# Patient Record
Sex: MALE | Race: WHITE
[De-identification: names, ages, dates, MRNs, and addresses within clinical notes are randomized per-mention and may not be internally consistent; named-entity substitution may affect disease eponyms.]

---

## 2018-02-25 ENCOUNTER — HOSPITAL ENCOUNTER (EMERGENCY)
Dept: HOSPITAL 52 - LL.ED | Age: 73
Discharge: NURSING, REHAB OR LONG-TERM CARE | End: 2018-02-25
Payer: MEDICARE

## 2018-02-25 DIAGNOSIS — I50.9: ICD-10-CM

## 2018-02-25 DIAGNOSIS — E78.00: ICD-10-CM

## 2018-02-25 DIAGNOSIS — I44.7: ICD-10-CM

## 2018-02-25 DIAGNOSIS — R79.89: ICD-10-CM

## 2018-02-25 DIAGNOSIS — I11.0: Primary | ICD-10-CM

## 2018-02-25 LAB
CHLORIDE SERPL-SCNC: 104 MMOL/L (ref 98–107)
SODIUM SERPL-SCNC: 143 MMOL/L (ref 136–145)

## 2018-02-25 PROCEDURE — 93005 ELECTROCARDIOGRAM TRACING: CPT

## 2018-02-25 PROCEDURE — 83880 ASSAY OF NATRIURETIC PEPTIDE: CPT

## 2018-02-25 PROCEDURE — 85025 COMPLETE CBC W/AUTO DIFF WBC: CPT

## 2018-02-25 PROCEDURE — 71275 CT ANGIOGRAPHY CHEST: CPT

## 2018-02-25 PROCEDURE — 96374 THER/PROPH/DIAG INJ IV PUSH: CPT

## 2018-02-25 PROCEDURE — 82553 CREATINE MB FRACTION: CPT

## 2018-02-25 PROCEDURE — 84484 ASSAY OF TROPONIN QUANT: CPT

## 2018-02-25 PROCEDURE — 71046 X-RAY EXAM CHEST 2 VIEWS: CPT

## 2018-02-25 PROCEDURE — 85379 FIBRIN DEGRADATION QUANT: CPT

## 2018-02-25 PROCEDURE — 80053 COMPREHEN METABOLIC PANEL: CPT

## 2018-02-25 PROCEDURE — 81001 URINALYSIS AUTO W/SCOPE: CPT

## 2018-02-25 PROCEDURE — 99285 EMERGENCY DEPT VISIT HI MDM: CPT

## 2018-02-25 PROCEDURE — 36415 COLL VENOUS BLD VENIPUNCTURE: CPT

## 2018-02-25 NOTE — EDM.PDOC
ED HPI GENERAL MEDICAL PROBLEM





- General


Chief Complaint: Respiratory Problem


Stated Complaint: sob


Time Seen by Provider: 02/25/18 05:18


Source of Information: Reports: Patient


History Limitations: Reports: No Limitations





- History of Present Illness


INITIAL COMMENTS - FREE TEXT/NARRATIVE: 





Patient comes to ER with complaint of SOB that has been present for several 

days.  Better when upright and has been sleeping in his recliner for the past 

few nights.  Worsens with activity/laying flat. 





Denies cough. No chest pain. Recalls having several episodes of lower chest 

pain around rib margins on right approximately a month ago that were brief and 

he attributed these to pleurisy/URI. No medical attention sought at that time. 





Denies heart/cardiovascular history except for hypertension. Non-smoker. 





Feels a pressure sensation in his shoulders at times, not pain. Also numbness/

tingling feeling in lower arms/hands.  He said this went away when placed on 

oxygen in the ER. 





Denies HEENT/GI/ changes. No fevers/chills. No recent illnesses or injuries. 





Says that overall he has an unremarkable past medical history. No CAD in family 

history. 


  ** Left Shoulder


Pain Score (Numeric/FACES): 3





- Related Data


 Allergies











Allergy/AdvReac Type Severity Reaction Status Date / Time


 


No Known Allergies Allergy   Verified 02/25/18 04:37











Home Meds: 


 Home Meds





DULoxetine HCl [Duloxetine HCl] 30 mg PO BID 02/25/18 [History]


Losartan/Hydrochlorothiazide [Losartan-HCTZ 100-25 MG] 1 tab PO DAILY 02/25/18 [

History]


Pantoprazole Sodium 40 mg PO DAILY 02/25/18 [History]


Potassium Chloride 20 meq PO DAILY 02/25/18 [History]











Past Medical History


HEENT History: Reports: Allergic Rhinitis


Cardiovascular History: Reports: High Cholesterol, Hypertension, Other (See 

Below) (previous Dx of possible incomplete RBBB)


Respiratory History: Reports: COPD (by CXR changes)


Gastrointestinal History: Reports: GERD, Hiatal Hernia, Irritable Bowel Syndrome

, PUD


Genitourinary History: Reports: BPH, Other (See Below) (Erectile dysfunction)


Musculoskeletal History: Reports: Osteoarthritis





Social & Family History





- Family History


Family Medical History: Noncontributory (Patient denies history of cardiac 

disease in family.  He does say that cancer runs in the family.)





- Tobacco Use


Smoking Status *Q: Never Smoker





- Alcohol Use


Alcohol Use History: No





- Recreational Drug Use


Recreational Drug Use: No


Drug Use in Last 12 Months: No





ED ROS GENERAL





- Review of Systems


Review Of Systems: See Below


Constitutional: Reports: Fatigue.  Denies: Fever, Chills, Diaphoresis, Weight 

Loss, Weight Gain


HEENT: Reports: No Symptoms


Respiratory: Reports: Shortness of Breath, Wheezing.  Denies: Pleuritic Chest 

Pain, Cough, Sputum, Hemoptysis


Cardiovascular: Reports: Dyspnea on Exertion.  Denies: Chest Pain, Edema, 

Lightheadedness, Palpitations, Syncope


GI/Abdominal: Reports: No Symptoms


: Reports: No Symptoms


Musculoskeletal: Reports: Other (tightness sensation left shoulder as well as 

both hands/wrists. Says that it is not actually painful.  )


Skin: Reports: No Symptoms


Neurological: Reports: Paresthesia (bilateral wrists/hands).  Denies: Confusion

, Dizziness, Headache, Trouble Speaking, Change in Speech, Gait Disturbance


Psychiatric: Reports: No Symptoms





ED EXAM, GENERAL





- Physical Exam


Exam: See Below


Exam Limited By: No Limitations


General Appearance: Alert, WD/WN, No Apparent Distress, Obese


Eye Exam: Bilateral Eye: EOMI, PERRL


Ears: Normal External Exam, Normal Canal


Nose: Normal Inspection


Throat/Mouth: Normal Inspection, Normal Lips, Normal Voice, No Airway Compromise


Head: Atraumatic, Normocephalic


Neck: Normal Inspection, Supple, Non-Tender, Full Range of Motion.  No: 

Lymphadenopathy (L), Lymphadenopathy (R)


Respiratory/Chest: No Respiratory Distress, No Accessory Muscle Use, Chest Non-

Tender, Decreased Breath Sounds (bilaterally lower lungs), Crackles (noted 

bilaterally, lower half lungs, faint).  No: Rales, Wheezing, Stridor, Accessory 

Muscle Use, Retractions


Cardiovascular: Normal Peripheral Pulses, Regular Rate, Rhythm, No Edema, No 

Murmur


Peripheral Pulses: 2+: Radial (L), Radial (R), Dorsalis Pedis (L), Dorsalis 

Pedis (R)


GI/Abdominal: Normal Bowel Sounds, Soft, Non-Tender, Other (obese, very rounded)

.  No: Guarding, Rigid, Rebound, Tender


 (Male) Exam: Deferred


Rectal (Males) Exam: Deferred


Back Exam: Normal Inspection


Extremities: Normal Range of Motion, Non-Tender, No Pedal Edema, Other (bluish 

tint to nail beds noted)


Neurological: Alert, Oriented, Normal Cognition, No Motor/Sensory Deficits


Psychiatric: Normal Affect, Normal Mood


Skin Exam: Warm, Dry, Intact





EKG INTERPRETATION


EKG Date: 02/25/18


Time: 05:11


Rhythm: Other (Sinus rhythm)


Rate (Beats/Min): 86


Axis: Normal


P-Wave: Present


QRS: LBBB


QT: Normal


Comparison: Change From Previous EKG


EKG Interpretation Comments: 





LBBB noted. No recent EKGs available to compare until 2009.  LBBB not present 

at that time. 


1st degree AV block present. 


Intermittent PACs noted on monitor and on EKG. 





Course





- Vital Signs


Last Recorded V/S: 


 Last Vital Signs











Temp  36.6 C   02/25/18 07:10


 


Pulse  90   02/25/18 07:25


 


Resp  22 H  02/25/18 07:25


 


BP  133/92 H  02/25/18 07:10


 


Pulse Ox  95   02/25/18 07:25














- Orders/Labs/Meds


Orders: 


 Active Orders 24 hr











 Category Date Time Status


 


 EKG Documentation Completion [RC] ASDIRECTED Care  02/25/18 04:49 Active


 


 RT Aerosol Therapy [RC] ASDIRECTED Care  02/25/18 05:20 Active


 


 CXR [Chest 2V] [CR] Stat Exams  02/25/18 04:48 Taken


 


 PE Chest [Ang Chest] [CT] Stat Exams  02/25/18 05:47 Taken


 


 Obtain Past Medical Record [OM.PC] Routine Oth  02/25/18 06:11 Active


 


 Saline Lock Insert [OM.PC] Routine Oth  02/25/18 04:51 Ordered











Labs: 


 Laboratory Tests











  02/25/18 02/25/18 02/25/18 Range/Units





  04:49 05:13 05:13 


 


WBC   8.7   (4.0-10.2)  K/uL


 


RBC   4.33   (4.33-5.41)  M/uL


 


Hgb   14.7   (13.1-16.8)  g/dL


 


Hct   43.6   (39.0-49.0)  %


 


MCV   100.7 H   (84.0-98.0)  fL


 


MCH   33.9 H   (28.2-33.3)  pg


 


MCHC   33.7   (31.7-36.0)  g/dL


 


RDW   13.5   (11.2-14.1)  %


 


Plt Count   258   (150-350)  K/uL


 


Neut % (Auto)   76.2   (45.0-80.0)  %


 


Lymph % (Auto)   14.0   (10.0-50.0)  %


 


Mono % (Auto)   8.2   (2.0-14.0)  %


 


Eos % (Auto)   1.4   (0.0-5.0)  %


 


Baso % (Auto)   0.2   (0.0-2.0)  %


 


Neut # (Auto)   6.63   (1.40-7.00)  K/uL


 


Lymph # (Auto)   1.22   (0.50-3.50)  K/uL


 


Mono # (Auto)   0.71   (0.00-1.00)  K/uL


 


Eos # (Auto)   0.12   (0.00-0.50)  K/uL


 


Baso # (Auto)   0.02   (0.00-0.20)  K/uL


 


D-Dimer, Quantitative    530 H  (0-400)  ng/mL


 


Sodium     (136-145)  mmol/L


 


Potassium     (3.5-5.1)  mmol/L


 


Chloride     ()  mmol/L


 


Carbon Dioxide     (21.0-32.0)  mmol/L


 


BUN     (7-18)  mg/dL


 


Creatinine     (0.51-1.17)  mg/dL


 


Est Cr Clr Drug Dosing     mL/min


 


Estimated GFR (MDRD)     mL/min


 


Glucose     ()  mg/dL


 


Calcium     (8.5-10.1)  mg/dL


 


Total Bilirubin     (0.2-1.0)  mg/dL


 


AST     (15-37)  U/L


 


ALT     (12-78)  U/L


 


Alkaline Phosphatase     ()  IU/L


 


Creatine Kinase Index     (0.0-2.5)  %


 


CK-MB (CK-2)     (0.00-3.60)  ng/mL


 


Troponin I     (0.000-0.056)  ng/mL


 


NT-Pro-B Natriuret Pep     (0-125)  pg/mL


 


Total Protein     (6.4-8.2)  g/dL


 


Albumin     (3.4-5.0)  g/dL


 


Specimen Type  Urincc    


 


Urine Color  Citlaly    


 


Urine Appearance  Clear    


 


Urine pH  5.5    (5.0-9.0)  


 


Ur Specific Gravity  1.010    (1.005-1.030)  


 


Urine Protein  30 H    (NEGATIVE)  mg/dL


 


Urine Glucose (UA)  Negative    (NEGATIVE)  mg/dL


 


Urine Ketones  Negative    (NEGATIVE)  mg/dL


 


Urine Occult Blood  Trace-intact H    (NEGATIVE)  


 


Urine Nitrite  Negative    (NEGATIVE)  


 


Urine Bilirubin  Negative    (NEGATIVE)  


 


Urine Urobilinogen  0.2    (0.2-1.0)  E.U./dL


 


Ur Leukocyte Esterase  Negative    (NEGATIVE)  


 


Urine RBC  0-5    /HPF


 


Urine WBC  0-5    /HPF


 


Ur Epithelial Cells  Occasional    /LPF


 


Urine Bacteria  Occasional    (NONE TO FEW)  /HPF














  02/25/18 Range/Units





  05:13 


 


WBC   (4.0-10.2)  K/uL


 


RBC   (4.33-5.41)  M/uL


 


Hgb   (13.1-16.8)  g/dL


 


Hct   (39.0-49.0)  %


 


MCV   (84.0-98.0)  fL


 


MCH   (28.2-33.3)  pg


 


MCHC   (31.7-36.0)  g/dL


 


RDW   (11.2-14.1)  %


 


Plt Count   (150-350)  K/uL


 


Neut % (Auto)   (45.0-80.0)  %


 


Lymph % (Auto)   (10.0-50.0)  %


 


Mono % (Auto)   (2.0-14.0)  %


 


Eos % (Auto)   (0.0-5.0)  %


 


Baso % (Auto)   (0.0-2.0)  %


 


Neut # (Auto)   (1.40-7.00)  K/uL


 


Lymph # (Auto)   (0.50-3.50)  K/uL


 


Mono # (Auto)   (0.00-1.00)  K/uL


 


Eos # (Auto)   (0.00-0.50)  K/uL


 


Baso # (Auto)   (0.00-0.20)  K/uL


 


D-Dimer, Quantitative   (0-400)  ng/mL


 


Sodium  143  (136-145)  mmol/L


 


Potassium  3.3 L  (3.5-5.1)  mmol/L


 


Chloride  104  ()  mmol/L


 


Carbon Dioxide  25.7  (21.0-32.0)  mmol/L


 


BUN  29 H  (7-18)  mg/dL


 


Creatinine  0.92  (0.51-1.17)  mg/dL


 


Est Cr Clr Drug Dosing  72.74  mL/min


 


Estimated GFR (MDRD)  > 60  mL/min


 


Glucose  141 H  ()  mg/dL


 


Calcium  9.4  (8.5-10.1)  mg/dL


 


Total Bilirubin  0.9  (0.2-1.0)  mg/dL


 


AST  17  (15-37)  U/L


 


ALT  39  (12-78)  U/L


 


Alkaline Phosphatase  62  ()  IU/L


 


Creatine Kinase Index  2.0  (0.0-2.5)  %


 


CK-MB (CK-2)  3.00  (0.00-3.60)  ng/mL


 


Troponin I  0.081 H*  (0.000-0.056)  ng/mL


 


NT-Pro-B Natriuret Pep  1433 H  (0-125)  pg/mL


 


Total Protein  7.5  (6.4-8.2)  g/dL


 


Albumin  3.4  (3.4-5.0)  g/dL


 


Specimen Type   


 


Urine Color   


 


Urine Appearance   


 


Urine pH   (5.0-9.0)  


 


Ur Specific Gravity   (1.005-1.030)  


 


Urine Protein   (NEGATIVE)  mg/dL


 


Urine Glucose (UA)   (NEGATIVE)  mg/dL


 


Urine Ketones   (NEGATIVE)  mg/dL


 


Urine Occult Blood   (NEGATIVE)  


 


Urine Nitrite   (NEGATIVE)  


 


Urine Bilirubin   (NEGATIVE)  


 


Urine Urobilinogen   (0.2-1.0)  E.U./dL


 


Ur Leukocyte Esterase   (NEGATIVE)  


 


Urine RBC   /HPF


 


Urine WBC   /HPF


 


Ur Epithelial Cells   /LPF


 


Urine Bacteria   (NONE TO FEW)  /HPF











Meds: 


Medications














Discontinued Medications














Generic Name Dose Route Start Last Admin





  Trade Name Freq  PRN Reason Stop Dose Admin


 


Albuterol/Ipratropium  3 ml  02/25/18 05:19  02/25/18 05:31





  Duoneb 3.0-0.5 Mg/3 Ml  NEB  02/25/18 05:20  3 ml





  ONETIME ONE   Administration


 


Aspirin  Confirm  02/25/18 07:33  02/25/18 08:30





  Aspirin  Administered  02/25/18 07:34  Not Given





  Dose   





  324 mg   





  .ROUTE   





  .STK-MED ONE   


 


Aspirin  324 mg  02/25/18 07:50  02/25/18 07:34





  Aspirin  PO  02/25/18 07:51  324 mg





  ONETIME ONE   Administration


 


Furosemide  40 mg  02/25/18 06:45  02/25/18 07:04





  Lasix  IVPUSH  02/25/18 06:46  Not Given





  NOW ONE   


 


Heparin Sodium (Porcine)  Confirm  02/25/18 07:33  02/25/18 08:29





  Heparin Sodium  Administered  02/25/18 07:34  Not Given





  Dose   





  5,000 units   





  .ROUTE   





  .STK-MED ONE   


 


Heparin Sodium (Porcine)  4,000 units  02/25/18 07:52  02/25/18 07:34





  Heparin Sodium  IVPUSH  02/25/18 07:53  4,000 units





  ONETIME ONE   Administration


 


Iopamidol  100 ml  02/25/18 05:51  02/25/18 06:42





  Isovue-370 (76%)  IVPUSH  02/25/18 05:52  100 ml





  ONETIME ONE   Administration


 


Nitroglycerin  0.4 mg  02/25/18 06:34  02/25/18 06:58





  Nitrostat  SL  02/25/18 06:35  0.4 mg





  ONETIME ONE   Administration


 


Potassium Chloride  40 meq  02/25/18 06:13  02/25/18 06:58





  Klor-Con M20  PO  02/25/18 06:14  40 meq





  ONETIME ONE   Administration


 


Sodium Chloride  10 ml  02/25/18 04:51  





  Saline Flush  FLUSH   





  ASDIRECTED PRN   





  Keep Vein Open   


 


Ticagrelor  Confirm  02/25/18 07:33  02/25/18 08:29





  Brilinta  Administered  02/25/18 07:34  Not Given





  Dose   





  180 mg   





  PO   





  .STK-MED ONE   


 


Ticagrelor  180 mg  02/25/18 07:53  02/25/18 07:34





  Brilinta  PO  02/25/18 07:54  180 mg





  ONETIME ONE   Administration














- Radiology Interpretation


Free Text/Narrative:: 





Chest film showed cardiomegaly as well as CHF


Comparison film from 5 years ago available. 


CT performed to rule out PE ultimately did not show PE present. Small right 

pleural effusion/CHF suggested as well as enlarged right hilar lymph nodes. 


Radiology did not call us with STAT results as per usual protocol. Report faxed 

to us at 7:20AM





- Re-Assessments/Exams


Free Text/Narrative Re-Assessment/Exam: 








Patient felt overall improved once on oxygen.  Tightness in shoulder as well as 

hand sensation and SOB improved.  No change noted later when given one nitro 

SL. Labs performed. 





When LBBB noted on EKG patient told us that he always had an unusual finding on 

his EKG but he could not remember what it was.  Could not recall if it was 

LBBB. No EKG in computer for comparison.  





CKMB normal. 


Troponin mildly elevated at 0.081


Glu 141


K 3.3


DDimer elevated at 530


ProBNP elevated at 1433





Given the described right lower chest pain that patient had described from 

earlier this month, along with the persistent SOB that had been bothering him 

for days, the possibility of PE was considered high and CT scan of chest 

ordered.





Once patient had completed scan, contact was made with [facility].  Patient had 

previous lumbar fusion performed there and it was hoped that they might have a 

more recent EKG on file.  A request to send our EKG and have Cardiology review 

it and compare it to any more recent EKG that they might have on file was made. 

EKG faxed.  





We were subsequently contacted by , on call for Cardiology.  He too 

wondered if this was a new LBBB.  


Work up status and labs results were discussed. 


 suspected based of of patient's negative CKMB and mildly + Troponin 

that patient likely had already had an infarct days before or even within the 

last month (possibly related to previous pain he had approx one month ago) and 

was not a candidate for anti-platelet therapy at this time. 


He also felt that the above complaints could also be related to pulmonary 

embolism as part of the differential diagnosis. 


It was decided to start transfer process of patient to [facility] while still 

waiting for CT results, with plan of patient going to cath lab if CT results 

were negative for PE. Patient continued to rest comfortably at this time.  In 

this patient's case, Radiology did not contact us personally by phone to relay 

official reading. Instead, a dictated result was faxed around 7:20am.  Call 

placed to [facility] concerning official results which showed no PE. 


 at that time instructed us to go ahead and give cardiac loading dose 

of Heparin, PO Brillinta.  Patient also received aspirin in addition to other 

medications.  He was transported to [facility] by air for further care.   





02/25/18 11:14


Patient originally denied having any significant past medical history except 

for hypertension.


Review of paper chart on file showed otherwise, and past medical history 

updated to match problem list found.








Departure





- Departure


Time of Disposition: 07:45


Disposition: DC/Tfer to Acute Hospital [facility]


Condition: Fair


Clinical Impression: 


 New onset left bundle branch block (LBBB), SOB (shortness of breath), Elevated 

troponin





CHF (congestive heart failure)


Qualifiers:


 Heart failure type: unspecified Heart failure chronicity: acute Qualified Code(

s): I50.9 - Heart failure, unspecified








- Discharge Information


Referrals: 


Ton Martinez PA [Primary Care Provider] - 


Forms:  ED Department Discharge





- My Orders


Last 24 Hours: 


My Active Orders





02/25/18 04:48


CXR [Chest 2V] [CR] Stat 





02/25/18 04:49


EKG Documentation Completion [RC] ASDIRECTED 





02/25/18 04:51


Saline Lock Insert [OM.PC] Routine 





02/25/18 05:20


RT Aerosol Therapy [RC] ASDIRECTED 





02/25/18 05:47


PE Chest [Ang Chest] [CT] Stat 





02/25/18 06:11


Obtain Past Medical Record [OM.PC] Routine 














- Assessment/Plan


Last 24 Hours: 


My Active Orders





02/25/18 04:48


CXR [Chest 2V] [CR] Stat 





02/25/18 04:49


EKG Documentation Completion [RC] ASDIRECTED 





02/25/18 04:51


Saline Lock Insert [OM.PC] Routine 





02/25/18 05:20


RT Aerosol Therapy [RC] ASDIRECTED 





02/25/18 05:47


PE Chest [Ang Chest] [CT] Stat 





02/25/18 06:11


Obtain Past Medical Record [OM.PC] Routine